# Patient Record
Sex: MALE | Race: WHITE | HISPANIC OR LATINO
[De-identification: names, ages, dates, MRNs, and addresses within clinical notes are randomized per-mention and may not be internally consistent; named-entity substitution may affect disease eponyms.]

---
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
## 2018-01-23 ENCOUNTER — RX ONLY (RX ONLY)
Age: 18
End: 2018-01-23

## 2018-01-23 RX ORDER — KETOCONAZOLE 20 MG/ML
SHAMPOO TOPICAL DAILY
Qty: 1 | Refills: 3 | Status: ERX

## 2018-01-25 ENCOUNTER — RX ONLY (RX ONLY)
Age: 18
End: 2018-01-25

## 2018-01-25 RX ORDER — ISOTRETINOIN 40 MG/1
1 CAPSULE, LIQUID FILLED ORAL QD
Qty: 30 | Refills: 0 | Status: ERX | COMMUNITY
Start: 2018-01-25

## 2018-07-06 ENCOUNTER — RX ONLY (RX ONLY)
Age: 18
End: 2018-07-06

## 2018-07-06 RX ORDER — KETOCONAZOLE 20 MG/ML
1 SHAMPOO TOPICAL DAILY
Qty: 1 | Refills: 3 | Status: ERX

## 2018-10-09 ENCOUNTER — RX ONLY (RX ONLY)
Age: 18
End: 2018-10-09

## 2018-10-09 RX ORDER — KETOCONAZOLE 20 MG/ML
SHAMPOO TOPICAL DAILY
Qty: 1 | Refills: 3 | Status: ERX